# Patient Record
Sex: MALE | Race: BLACK OR AFRICAN AMERICAN | NOT HISPANIC OR LATINO | URBAN - METROPOLITAN AREA
[De-identification: names, ages, dates, MRNs, and addresses within clinical notes are randomized per-mention and may not be internally consistent; named-entity substitution may affect disease eponyms.]

---

## 2020-09-02 ENCOUNTER — INPATIENT (INPATIENT)
Facility: HOSPITAL | Age: 75
LOS: 2 days | Discharge: HOME | End: 2020-09-05
Attending: INTERNAL MEDICINE | Admitting: INTERNAL MEDICINE
Payer: MEDICARE

## 2020-09-02 VITALS
DIASTOLIC BLOOD PRESSURE: 109 MMHG | HEIGHT: 76 IN | SYSTOLIC BLOOD PRESSURE: 151 MMHG | OXYGEN SATURATION: 96 % | TEMPERATURE: 98 F | RESPIRATION RATE: 18 BRPM | HEART RATE: 115 BPM | WEIGHT: 134.92 LBS

## 2020-09-02 DIAGNOSIS — R33.8 OTHER RETENTION OF URINE: ICD-10-CM

## 2020-09-02 DIAGNOSIS — R62.7 ADULT FAILURE TO THRIVE: ICD-10-CM

## 2020-09-02 DIAGNOSIS — N13.30 UNSPECIFIED HYDRONEPHROSIS: ICD-10-CM

## 2020-09-02 DIAGNOSIS — E53.8 DEFICIENCY OF OTHER SPECIFIED B GROUP VITAMINS: ICD-10-CM

## 2020-09-02 DIAGNOSIS — N18.9 CHRONIC KIDNEY DISEASE, UNSPECIFIED: ICD-10-CM

## 2020-09-02 DIAGNOSIS — N17.9 ACUTE KIDNEY FAILURE, UNSPECIFIED: ICD-10-CM

## 2020-09-02 DIAGNOSIS — E43 UNSPECIFIED SEVERE PROTEIN-CALORIE MALNUTRITION: ICD-10-CM

## 2020-09-02 DIAGNOSIS — E87.6 HYPOKALEMIA: ICD-10-CM

## 2020-09-02 DIAGNOSIS — D17.39 BENIGN LIPOMATOUS NEOPLASM OF SKIN AND SUBCUTANEOUS TISSUE OF OTHER SITES: ICD-10-CM

## 2020-09-02 DIAGNOSIS — E83.39 OTHER DISORDERS OF PHOSPHORUS METABOLISM: ICD-10-CM

## 2020-09-02 DIAGNOSIS — N40.1 BENIGN PROSTATIC HYPERPLASIA WITH LOWER URINARY TRACT SYMPTOMS: ICD-10-CM

## 2020-09-02 DIAGNOSIS — E87.0 HYPEROSMOLALITY AND HYPERNATREMIA: ICD-10-CM

## 2020-09-02 LAB
ALBUMIN SERPL ELPH-MCNC: 3.3 G/DL — SIGNIFICANT CHANGE UP (ref 3.3–5)
ALP SERPL-CCNC: 68 U/L — SIGNIFICANT CHANGE UP (ref 40–120)
ALT FLD-CCNC: 13 U/L — SIGNIFICANT CHANGE UP (ref 12–78)
ANION GAP SERPL CALC-SCNC: 24 MMOL/L — HIGH (ref 5–17)
APPEARANCE UR: CLEAR — SIGNIFICANT CHANGE UP
APTT BLD: 33.7 SEC — SIGNIFICANT CHANGE UP (ref 27.5–35.5)
AST SERPL-CCNC: 14 U/L — LOW (ref 15–37)
BACTERIA # UR AUTO: ABNORMAL
BASE EXCESS BLDA CALC-SCNC: -9.6 MMOL/L — LOW (ref -2–2)
BASOPHILS # BLD AUTO: 0.02 K/UL — SIGNIFICANT CHANGE UP (ref 0–0.2)
BASOPHILS NFR BLD AUTO: 0.2 % — SIGNIFICANT CHANGE UP (ref 0–2)
BILIRUB SERPL-MCNC: 0.5 MG/DL — SIGNIFICANT CHANGE UP (ref 0.2–1.2)
BILIRUB UR-MCNC: NEGATIVE — SIGNIFICANT CHANGE UP
BLOOD GAS COMMENTS: SIGNIFICANT CHANGE UP
BLOOD GAS COMMENTS: SIGNIFICANT CHANGE UP
BLOOD GAS SOURCE: SIGNIFICANT CHANGE UP
BUN SERPL-MCNC: 162 MG/DL — HIGH (ref 7–23)
CALCIUM SERPL-MCNC: 9.8 MG/DL — SIGNIFICANT CHANGE UP (ref 8.5–10.1)
CHLORIDE SERPL-SCNC: 104 MMOL/L — SIGNIFICANT CHANGE UP (ref 96–108)
CK MB CFR SERPL CALC: 2.1 NG/ML — SIGNIFICANT CHANGE UP (ref 0.5–3.6)
CO2 SERPL-SCNC: 16 MMOL/L — LOW (ref 22–31)
COLOR SPEC: YELLOW — SIGNIFICANT CHANGE UP
COMMENT - URINE: SIGNIFICANT CHANGE UP
CREAT SERPL-MCNC: 23.2 MG/DL — HIGH (ref 0.5–1.3)
DIFF PNL FLD: ABNORMAL
EOSINOPHIL # BLD AUTO: 0.15 K/UL — SIGNIFICANT CHANGE UP (ref 0–0.5)
EOSINOPHIL NFR BLD AUTO: 1.9 % — SIGNIFICANT CHANGE UP (ref 0–6)
GLUCOSE SERPL-MCNC: 113 MG/DL — HIGH (ref 70–99)
GLUCOSE UR QL: NEGATIVE MG/DL — SIGNIFICANT CHANGE UP
HCO3 BLDA-SCNC: 14 MMOL/L — LOW (ref 21–29)
HCT VFR BLD CALC: 38.5 % — LOW (ref 39–50)
HGB BLD-MCNC: 13.1 G/DL — SIGNIFICANT CHANGE UP (ref 13–17)
HOROWITZ INDEX BLDA+IHG-RTO: 0.21 — SIGNIFICANT CHANGE UP
IMM GRANULOCYTES NFR BLD AUTO: 0.5 % — SIGNIFICANT CHANGE UP (ref 0–1.5)
INR BLD: 1.43 RATIO — HIGH (ref 0.88–1.16)
KETONES UR-MCNC: ABNORMAL
LACTATE SERPL-SCNC: 1 MMOL/L — SIGNIFICANT CHANGE UP (ref 0.7–2)
LEUKOCYTE ESTERASE UR-ACNC: ABNORMAL
LIDOCAIN IGE QN: 459 U/L — HIGH (ref 73–393)
LYMPHOCYTES # BLD AUTO: 0.71 K/UL — LOW (ref 1–3.3)
LYMPHOCYTES # BLD AUTO: 8.8 % — LOW (ref 13–44)
MAGNESIUM SERPL-MCNC: 2.5 MG/DL — SIGNIFICANT CHANGE UP (ref 1.6–2.6)
MCHC RBC-ENTMCNC: 29.3 PG — SIGNIFICANT CHANGE UP (ref 27–34)
MCHC RBC-ENTMCNC: 34 GM/DL — SIGNIFICANT CHANGE UP (ref 32–36)
MCV RBC AUTO: 86.1 FL — SIGNIFICANT CHANGE UP (ref 80–100)
MONOCYTES # BLD AUTO: 0.9 K/UL — SIGNIFICANT CHANGE UP (ref 0–0.9)
MONOCYTES NFR BLD AUTO: 11.2 % — SIGNIFICANT CHANGE UP (ref 2–14)
NEUTROPHILS # BLD AUTO: 6.23 K/UL — SIGNIFICANT CHANGE UP (ref 1.8–7.4)
NEUTROPHILS NFR BLD AUTO: 77.4 % — HIGH (ref 43–77)
NITRITE UR-MCNC: NEGATIVE — SIGNIFICANT CHANGE UP
NRBC # BLD: 0 /100 WBCS — SIGNIFICANT CHANGE UP (ref 0–0)
NT-PROBNP SERPL-SCNC: 550 PG/ML — HIGH (ref 0–450)
PCO2 BLDA: 25 MMHG — LOW (ref 32–46)
PH BLD: 7.37 — SIGNIFICANT CHANGE UP (ref 7.35–7.45)
PH UR: 5 — SIGNIFICANT CHANGE UP (ref 5–8)
PLATELET # BLD AUTO: 183 K/UL — SIGNIFICANT CHANGE UP (ref 150–400)
PO2 BLDA: 49 MMHG — CRITICAL LOW (ref 74–108)
POTASSIUM SERPL-MCNC: 4.2 MMOL/L — SIGNIFICANT CHANGE UP (ref 3.5–5.3)
POTASSIUM SERPL-SCNC: 4.2 MMOL/L — SIGNIFICANT CHANGE UP (ref 3.5–5.3)
PROT SERPL-MCNC: 8.1 GM/DL — SIGNIFICANT CHANGE UP (ref 6–8.3)
PROT UR-MCNC: 15 MG/DL
PROTHROM AB SERPL-ACNC: 16.3 SEC — HIGH (ref 10.6–13.6)
RBC # BLD: 4.47 M/UL — SIGNIFICANT CHANGE UP (ref 4.2–5.8)
RBC # FLD: 13.2 % — SIGNIFICANT CHANGE UP (ref 10.3–14.5)
RBC CASTS # UR COMP ASSIST: SIGNIFICANT CHANGE UP /HPF (ref 0–4)
SAO2 % BLDA: 80 % — LOW (ref 92–96)
SARS-COV-2 RNA SPEC QL NAA+PROBE: SIGNIFICANT CHANGE UP
SODIUM SERPL-SCNC: 144 MMOL/L — SIGNIFICANT CHANGE UP (ref 135–145)
SP GR SPEC: 1.01 — SIGNIFICANT CHANGE UP (ref 1.01–1.02)
TROPONIN I SERPL-MCNC: <.015 NG/ML — SIGNIFICANT CHANGE UP (ref 0.01–0.04)
TSH SERPL-MCNC: 1.15 UU/ML — SIGNIFICANT CHANGE UP (ref 0.36–3.74)
UROBILINOGEN FLD QL: NEGATIVE MG/DL — SIGNIFICANT CHANGE UP
WBC # BLD: 8.05 K/UL — SIGNIFICANT CHANGE UP (ref 3.8–10.5)
WBC # FLD AUTO: 8.05 K/UL — SIGNIFICANT CHANGE UP (ref 3.8–10.5)
WBC UR QL: ABNORMAL

## 2020-09-02 PROCEDURE — 99223 1ST HOSP IP/OBS HIGH 75: CPT | Mod: AI

## 2020-09-02 PROCEDURE — 74176 CT ABD & PELVIS W/O CONTRAST: CPT | Mod: 26

## 2020-09-02 PROCEDURE — 71250 CT THORAX DX C-: CPT | Mod: 26

## 2020-09-02 PROCEDURE — 93010 ELECTROCARDIOGRAM REPORT: CPT

## 2020-09-02 PROCEDURE — 99291 CRITICAL CARE FIRST HOUR: CPT | Mod: CS

## 2020-09-02 PROCEDURE — 70450 CT HEAD/BRAIN W/O DYE: CPT | Mod: 26

## 2020-09-02 RX ORDER — AMLODIPINE BESYLATE 2.5 MG/1
10 TABLET ORAL ONCE
Refills: 0 | Status: COMPLETED | OUTPATIENT
Start: 2020-09-02 | End: 2020-09-02

## 2020-09-02 RX ORDER — HYDRALAZINE HCL 50 MG
10 TABLET ORAL ONCE
Refills: 0 | Status: COMPLETED | OUTPATIENT
Start: 2020-09-02 | End: 2020-09-02

## 2020-09-02 RX ORDER — SODIUM CHLORIDE 9 MG/ML
1000 INJECTION, SOLUTION INTRAVENOUS ONCE
Refills: 0 | Status: COMPLETED | OUTPATIENT
Start: 2020-09-02 | End: 2020-09-02

## 2020-09-02 RX ORDER — INFLUENZA VIRUS VACCINE 15; 15; 15; 15 UG/.5ML; UG/.5ML; UG/.5ML; UG/.5ML
0.5 SUSPENSION INTRAMUSCULAR ONCE
Refills: 0 | Status: DISCONTINUED | OUTPATIENT
Start: 2020-09-02 | End: 2020-09-05

## 2020-09-02 RX ADMIN — SODIUM CHLORIDE 1000 MILLILITER(S): 9 INJECTION, SOLUTION INTRAVENOUS at 07:42

## 2020-09-02 RX ADMIN — AMLODIPINE BESYLATE 10 MILLIGRAM(S): 2.5 TABLET ORAL at 08:32

## 2020-09-02 RX ADMIN — Medication 10 MILLIGRAM(S): at 08:31

## 2020-09-02 RX ADMIN — SODIUM CHLORIDE 1000 MILLILITER(S): 9 INJECTION, SOLUTION INTRAVENOUS at 08:32

## 2020-09-02 NOTE — ED PROVIDER NOTE - CLINICAL SUMMARY MEDICAL DECISION MAKING FREE TEXT BOX
failure to thrive. likely constipated. likely lipoma of chest. admit for placement  I read ekg as sinus tach rate 112, pacs, lad, no st elevation or depression, no t inversions, qtc 469. failure to thrive. likely constipated. likely lipoma of chest. admit for placement  I read ekg as sinus tach rate 112, pacs, lad, no st elevation or depression, no t inversions, qtc 469.  kidneys have failed. pt has anion gap acidosis that is likely uremic acidosis vs starvation ketosis. case kristy moody.

## 2020-09-02 NOTE — ED ADULT TRIAGE NOTE - CHIEF COMPLAINT QUOTE
Pt BIBA for constipation for 2 days. However, Pt has been noncompliant with meds for months, developed a lump on the left side of his chest a few months ago and a distended abd today. Decreased PO intake. Pt appears in unkempt condition. Normally A&Ox4 , today he is A&Ox2. Pt lives by himself.

## 2020-09-02 NOTE — ED PROVIDER NOTE - SECONDARY DIAGNOSIS.
Acute renal failure, unspecified acute renal failure type Lipoma of torso Other constipation Metabolic acidosis

## 2020-09-02 NOTE — H&P ADULT - ASSESSMENT
75 years old male with acute kidney injury and after toure placed 1700  liter of urine came out - patient used to be on meds  like warfarin but none sice january     IMPROVE VTE Individual Risk Assessment          RISK                                                          Points  [  ] Previous VTE                                                3  [  ] Thrombophilia                                             2  [  ] Lower limb paralysis                                   2        (unable to hold up >15 seconds)    [  ] Current Cancer                                             2         (within 6 months)  [  ] Immobilization > 24 hrs                              1  [  ] ICU/CCU stay > 24 hours                             1  [  ] Age > 60                                                         1    IMPROVE VTE Score: 2

## 2020-09-02 NOTE — H&P ADULT - NSHPPHYSICALEXAM_GEN_ALL_CORE
ICU Vital Signs Last 24 Hrs  T(C): 36.8 (02 Sep 2020 10:23), Max: 36.8 (02 Sep 2020 07:32)  T(F): 98.3 (02 Sep 2020 10:23), Max: 98.3 (02 Sep 2020 07:32)  HR: 116 (02 Sep 2020 10:23) (105 - 116)  BP: 140/90 (02 Sep 2020 10:23) (140/90 - 152/112)  BP(mean): --  ABP: --  ABP(mean): --  RR: 18 (02 Sep 2020 10:23) (17 - 18)  SpO2: 100% (02 Sep 2020 10:23) (96% - 100%)  GENERAL: NAD well-developed  HEAD:  Atraumatic, Normocephalic  EYES: EOMI, PERRLA, conjunctiva and sclera clear  ENMT: No tonsillar erythema, exudates, or enlargement; Moist mucous membranes, Good dentition, No lesions  NECK: Supple, No JVD, Normal thyroid  NERVOUS SYSTEM:  Alert & Oriented X3, Good concentration; Motor Strength 5/5 B/L upper and lower extremities; DTRs 2+ intact and symmetric  CHEST/LUNG: Clear to percussion bilaterally; No rales, rhonchi, wheezing, or rubs  HEART: Regular rate and rhythm; No murmurs, rubs, or gallops  ABDOMEN: Soft, Nontender, Nondistended; Bowel sounds present  EXTREMITIES:  2+ Peripheral Pulses, No clubbing, cyanosis, or edema  LYMPH: No lymphadenopathy   SKIN: No rashes or lesions ICU Vital Signs Last 24 Hrs  T(C): 36.8 (02 Sep 2020 10:23), Max: 36.8 (02 Sep 2020 07:32)  T(F): 98.3 (02 Sep 2020 10:23), Max: 98.3 (02 Sep 2020 07:32)  HR: 116 (02 Sep 2020 10:23) (105 - 116)  BP: 140/90 (02 Sep 2020 10:23) (140/90 - 152/112)  BP(mean): --  ABP: --  ABP(mean): --  RR: 18 (02 Sep 2020 10:23) (17 - 18)  SpO2: 100% (02 Sep 2020 10:23) (96% - 100%)  GENERAL: NAD well-developed  HEAD:  Atraumatic, Normocephalic  EYES: EOMI, PERRLA, conjunctiva and sclera clear  ENMT: No tonsillar erythema, exudates, or enlargement; Moist mucous membranes, Good dentition, No lesions  NECK: Supple, No JVD, Normal thyroid  NERVOUS SYSTEM:  Alert & Oriented X2, Good concentration; Motor Strength 5/5 B/L upper and lower extremities; DTRs 2+ intact and symmetric  CHEST/LUNG: Clear to percussion bilaterally; No rales, rhonchi, wheezing, or rubs  HEART: Regular rate and rhythm; No murmurs, rubs, or gallops  ABDOMEN: Soft, Nontender, Nondistended; Bowel sounds present  EXTREMITIES:  2+ Peripheral Pulses, No clubbing, cyanosis, or edema  LYMPH: No lymphadenopathy   SKIN: No rashes or lesions

## 2020-09-02 NOTE — CONSULT NOTE ADULT - SUBJECTIVE AND OBJECTIVE BOX
Patient chart reviewed, full consult to follow.   JOAQUIN post renal azotemia.  Patient alert x1; no tremors;   Grossly enlarged bladder;  Will trend Cr post toure, hold hemodialytic intervention  Discussed with daughter at bedside.    Thank you for the courtesy of this consultation. [facility] NEPHROLOGY [facility], [facility]  NEPHROLOGY AND HYPERTENSION  [address]  [address]  [place] [address]  [phone]    MD FRANCISCO KING MD ANDREY GONCHARUK, MD MADHU KORRAPATI, MD YELENA ROSENBERG, MD BINNY KOSHY, MD CHRISTOPHER CAPUTO, MD BRITT NORRIS MD      Information from chart:  "Patient is a 75y old  Male who presents with a chief complaint of failure to thrive (02 Sep 2020 10:15)    HPI:  75m unknown med hx pw decreased responsiveness and ability to care for self. per daughter at bedside, she went to see him and he was lying with his abd markedly distended, unable to have a bm. also left chest lipoma bigger than normal. pt complains of pain "everywhere" but doesn't elaborate (02 Sep 2020 10:15)   "    Daughter at bedside; increasing abd distention for 1 week       PAST MEDICAL & SURGICAL HISTORY:  No pertinent past medical history    FAMILY HISTORY:    Allergies    No Known Allergies    Intolerances      Home Medications:    MEDICATIONS  (STANDING):    MEDICATIONS  (PRN):      Patient alert x1 conversive  Vital Signs Last 24 Hrs  T(C): 36.9 (02 Sep 2020 12:06), Max: 36.9 (02 Sep 2020 12:06)  T(F): 98.4 (02 Sep 2020 12:06), Max: 98.4 (02 Sep 2020 12:06)  HR: 102 (02 Sep 2020 12:06) (102 - 116)  BP: 134/77 (02 Sep 2020 12:06) (134/77 - 152/112)  BP(mean): --  RR: 18 (02 Sep 2020 12:06) (17 - 18)  SpO2: 97% (02 Sep 2020 12:06) (96% - 100%)    Daily Height in cm: 193.04 (02 Sep 2020 06:59)    Daily     20 @ 07:01  -  20 @ 19:40  --------------------------------------------------------  IN: 0 mL / OUT: 6025 mL / NET: -6025 mL      CAPILLARY BLOOD GLUCOSE        PHYSICAL EXAM:      T(C): 36.9 (20 @ 12:06), Max: 36.9 (20 @ 12:06)  HR: 102 (20 @ 12:06) (102 - 116)  BP: 134/77 (20 @ 12:06) (134/77 - 152/112)  RR: 18 (20 @ 12:06) (17 - 18)  SpO2: 97% (20 @ 12:06) (96% - 100%)  Wt(kg): --  Lungs clear  Heart S1S2 NO RUB  Abd soft NT ND  Extremities:   tr edema                  144  |  104  |  162<H>  ----------------------------<  113<H>  4.2   |  16<L>  |  23.20<H>    Ca    9.8      02 Sep 2020 07:38  Mg     2.5         TPro  8.1  /  Alb  3.3  /  TBili  0.5  /  DBili  x   /  AST  14<L>  /  ALT  13  /  AlkPhos  68                            13.1   8.05  )-----------( 183      ( 02 Sep 2020 07:38 )             38.5     Creatinine Trend: 23.20<--  Urinalysis Basic - ( 02 Sep 2020 11:04 )    Color: Yellow / Appearance: Clear / S.015 / pH: x  Gluc: x / Ketone: Trace  / Bili: Negative / Urobili: Negative mg/dL   Blood: x / Protein: 15 mg/dL / Nitrite: Negative   Leuk Esterase: Trace / RBC: TNTC /HPF / WBC 6-10   Sq Epi: x / Non Sq Epi: x / Bacteria: Occasional      ABG - ( 02 Sep 2020 10:33 )  pH, Arterial: x     pH, Blood: 7.37  /  pCO2: 25    /  pO2: 49    / HCO3: 14    / Base Excess: -9.6  /  SaO2: 80            < from: CT Abdomen and Pelvis No Cont (20 @ 09:39) >  IMPRESSION:  1. No lung consolidations.  2. Anterior left upper chest wall mass (12.1 x 5.4 x 13.8 cm) containing fat and soft tissue, suspicious for liposarcoma.  3. Enlarged prostate gland.  4. Distended urinary bladder.  5. Mild bilateral hydroureteronephrosis, possibly secondary to bladder distention.  6. No bowel obstruction                  Assessment  JOAQUIN post renal azotemia, acute on chronic  Degree of CKD unclear  Patient alert x1; no tremors;   Grossly enlarged bladder;    Plan  Will trend Cr post toure, hold hemodialytic intervention  Discussed with daughter at bedside.    JACIEL Kaufman MD

## 2020-09-02 NOTE — H&P ADULT - HISTORY OF PRESENT ILLNESS
75m unknown med hx pw decreased responsiveness and ability to care for self. per daughter at bedside, she went to see him and he was lying with his abd markedly distended, unable to have a bm. also left chest lipoma bigger than normal. pt complains of pain "everywhere" but doesn't elaborate Full range of motion of upper and lower extremities, no joint tenderness/swelling.

## 2020-09-02 NOTE — ED PROVIDER NOTE - OBJECTIVE STATEMENT
75m unknown med hx pw decreased responsiveness and ability to care for self. per daughter at bedside, she went to see him and he was lying with his abd markedly distended, unable to have a bm. also left chest lipoma bigger than normal. pt complains of pain "everywhere" but doesn't elaborate

## 2020-09-02 NOTE — ED PROVIDER NOTE - CARE PLAN
Principal Discharge DX:	Failure to thrive in adult Principal Discharge DX:	Failure to thrive in adult  Secondary Diagnosis:	Acute renal failure, unspecified acute renal failure type  Secondary Diagnosis:	Lipoma of torso  Secondary Diagnosis:	Other constipation  Secondary Diagnosis:	Metabolic acidosis

## 2020-09-02 NOTE — ED ADULT NURSE NOTE - NSIMPLEMENTINTERV_GEN_ALL_ED
Implemented All Fall Risk Interventions:  [place] to call system. Call bell, personal items and telephone within reach. Instruct patient to call for assistance. Room bathroom lighting operational. Non-slip footwear when patient is off stretcher. Physically safe environment: no spills, clutter or unnecessary equipment. Stretcher in lowest position, wheels locked, appropriate side rails in place. Provide visual cue, wrist band, yellow gown, etc. Monitor gait and stability. Monitor for mental status changes and reorient to person, place, and time. Review medications for side effects contributing to fall risk. Reinforce activity limits and safety measures with patient and family.

## 2020-09-02 NOTE — ED ADULT NURSE NOTE - OBJECTIVE STATEMENT
PT BIBEMS for generalized abdominal pain x 3 days.  As per daughter, pt has been increasingly lethargic and a little confused x 2 days.  Pt AxOx2 (oriented to place and self).  Pt states last BM was approx 3-4 days ago.  Denies fever/chills, n/v.  PMH HTN, DM and angina.

## 2020-09-03 DIAGNOSIS — R22.2 LOCALIZED SWELLING, MASS AND LUMP, TRUNK: ICD-10-CM

## 2020-09-03 LAB
ANION GAP SERPL CALC-SCNC: 10 MMOL/L — SIGNIFICANT CHANGE UP (ref 5–17)
BUN SERPL-MCNC: 83 MG/DL — HIGH (ref 7–23)
CALCIUM SERPL-MCNC: 9.6 MG/DL — SIGNIFICANT CHANGE UP (ref 8.5–10.1)
CHLORIDE SERPL-SCNC: 117 MMOL/L — HIGH (ref 96–108)
CO2 SERPL-SCNC: 22 MMOL/L — SIGNIFICANT CHANGE UP (ref 22–31)
CREAT SERPL-MCNC: 4.5 MG/DL — HIGH (ref 0.5–1.3)
CULTURE RESULTS: NO GROWTH — SIGNIFICANT CHANGE UP
GLUCOSE SERPL-MCNC: 104 MG/DL — HIGH (ref 70–99)
HCV AB S/CO SERPL IA: 0.13 S/CO — SIGNIFICANT CHANGE UP (ref 0–0.99)
HCV AB SERPL-IMP: SIGNIFICANT CHANGE UP
POTASSIUM SERPL-MCNC: 3.6 MMOL/L — SIGNIFICANT CHANGE UP (ref 3.5–5.3)
POTASSIUM SERPL-SCNC: 3.6 MMOL/L — SIGNIFICANT CHANGE UP (ref 3.5–5.3)
SARS-COV-2 IGG SERPL QL IA: NEGATIVE — SIGNIFICANT CHANGE UP
SARS-COV-2 IGM SERPL IA-ACNC: <3.8 AU/ML — SIGNIFICANT CHANGE UP
SODIUM SERPL-SCNC: 149 MMOL/L — HIGH (ref 135–145)
SPECIMEN SOURCE: SIGNIFICANT CHANGE UP

## 2020-09-03 PROCEDURE — 99233 SBSQ HOSP IP/OBS HIGH 50: CPT

## 2020-09-03 PROCEDURE — 99232 SBSQ HOSP IP/OBS MODERATE 35: CPT

## 2020-09-03 RX ORDER — TAMSULOSIN HYDROCHLORIDE 0.4 MG/1
0.4 CAPSULE ORAL AT BEDTIME
Refills: 0 | Status: DISCONTINUED | OUTPATIENT
Start: 2020-09-03 | End: 2020-09-05

## 2020-09-03 RX ORDER — SODIUM CHLORIDE 9 MG/ML
1000 INJECTION, SOLUTION INTRAVENOUS
Refills: 0 | Status: DISCONTINUED | OUTPATIENT
Start: 2020-09-03 | End: 2020-09-05

## 2020-09-03 RX ADMIN — TAMSULOSIN HYDROCHLORIDE 0.4 MILLIGRAM(S): 0.4 CAPSULE ORAL at 21:48

## 2020-09-03 RX ADMIN — SODIUM CHLORIDE 60 MILLILITER(S): 9 INJECTION, SOLUTION INTRAVENOUS at 14:40

## 2020-09-03 NOTE — PROGRESS NOTE ADULT - SUBJECTIVE AND OBJECTIVE BOX
Subjective: no complaints. Ample urine in Rodriguez bag      MEDICATIONS  (STANDING):  influenza   Vaccine 0.5 milliLiter(s) IntraMuscular once    MEDICATIONS  (PRN):          T(C): 36.5 (20 @ 05:29), Max: 36.9 (20 @ 12:06)  HR: 105 (20 @ 05:29) (100 - 116)  BP: 131/87 (20 @ 05:29) (128/78 - 140/90)  RR: 18 (20 @ 05:29) (18 - 18)  SpO2: 98% (20 @ 05:29) (96% - 100%)  Wt(kg): --    ABG - ( 02 Sep 2020 10:33 )  pH, Arterial: x     pH, Blood: 7.37  /  pCO2: 25    /  pO2: 49    / HCO3: 14    / Base Excess: -9.6  /  SaO2: 80                  I&O's Detail    02 Sep 2020 07:01  -  03 Sep 2020 07:00  --------------------------------------------------------  IN:  Total IN: 0 mL    OUT:    Indwelling Catheter - Urethral: 6025 mL    Voided: 1700 mL  Total OUT: 7725 mL    Total NET: -7725 mL               PHYSICAL EXAM:    GENERAL: NAD  NECK: Supple, no inc in JVP  CHEST/LUNG: Clear  HEART: S1S2  ABDOMEN: Soft, Nontender, Nondistended; Bowel sounds present  EXTREMITIES:  no edema  NEURO: no asterixis      LABS:  CBC Full  -  ( 02 Sep 2020 07:38 )  WBC Count : 8.05 K/uL  RBC Count : 4.47 M/uL  Hemoglobin : 13.1 g/dL  Hematocrit : 38.5 %  Platelet Count - Automated : 183 K/uL  Mean Cell Volume : 86.1 fl  Mean Cell Hemoglobin : 29.3 pg  Mean Cell Hemoglobin Concentration : 34.0 gm/dL  Auto Neutrophil # : 6.23 K/uL  Auto Lymphocyte # : 0.71 K/uL  Auto Monocyte # : 0.90 K/uL  Auto Eosinophil # : 0.15 K/uL  Auto Basophil # : 0.02 K/uL  Auto Neutrophil % : 77.4 %  Auto Lymphocyte % : 8.8 %  Auto Monocyte % : 11.2 %  Auto Eosinophil % : 1.9 %  Auto Basophil % : 0.2 %        149<H>  |  117<H>  |  83<H>  ----------------------------<  104<H>  3.6   |  22  |  4.50<H>    Ca    9.6      03 Sep 2020 06:38  Mg     2.5         TPro  8.1  /  Alb  3.3  /  TBili  0.5  /  DBili  x   /  AST  14<L>  /  ALT  13  /  AlkPhos  68      PT/INR - ( 02 Sep 2020 08:51 )   PT: 16.3 sec;   INR: 1.43 ratio         PTT - ( 02 Sep 2020 08:51 )  PTT:33.7 sec  Urinalysis Basic - ( 02 Sep 2020 11:04 )    Color: Yellow / Appearance: Clear / S.015 / pH: x  Gluc: x / Ketone: Trace  / Bili: Negative / Urobili: Negative mg/dL   Blood: x / Protein: 15 mg/dL / Nitrite: Negative   Leuk Esterase: Trace / RBC: TNTC /HPF / WBC 6-10   Sq Epi: x / Non Sq Epi: x / Bacteria: Occasional        Impression:  JOAQUIN post-renal azotemia. Rapidly better  Degree of CKD unclear   Grossly enlarged bladder;    Recommendations:   Remains without dialytic indications  Maintain Rodriguez  Cont to follow Cr

## 2020-09-03 NOTE — PROGRESS NOTE ADULT - SUBJECTIVE AND OBJECTIVE BOX
Patient is a 75y old  Male who presents with a chief complaint of failure to thrive (03 Sep 2020 09:24)      OVERNIGHT EVENTS:  none     MEDICATIONS  (STANDING):  influenza   Vaccine 0.5 milliLiter(s) IntraMuscular once    MEDICATIONS  (PRN):    Allergies    No Known Allergies    Intolerances        SUBJECTIVE: in bed in NAD, no acute events overnight     T(F): 98.3 (20 @ 11:57), Max: 98.3 (20 @ 11:57)  HR: 104 (20 @ 11:57) (100 - 105)  BP: 109/64 (20 @ 11:57) (109/64 - 131/87)  RR: 18 (20 @ 11:57) (18 - 18)  SpO2: 97% (20 @ 11:57) (96% - 98%)  Wt(kg): --    PHYSICAL EXAM:  GENERAL: NAD, well-groomed, well-developed  HEAD:  Atraumatic, Normocephalic  EYES: EOMI, PERRLA, conjunctiva and sclera clear  ENMT: No tonsillar erythema, exudates, or enlargement; Moist mucous membranes, Good dentition, No lesions  NECK: Supple, No JVD, Normal thyroid  CHEST/LUNG: Clear to  auscultation bilaterally; No rales, rhonchi, wheezing, or rubs  bilaterally  HEART: Regular rate and rhythm; No murmurs, rubs, or gallops  ABDOMEN: Soft, Nontender, Nondistended; Bowel sounds present  EXTREMITIES:  2+ Peripheral Pulses, No clubbing, cyanosis, or edema BL LE  SKIN: No rashes or lesions  NERVOUS SYSTEM:  Alert & Oriented X3, Good concentration; Motor Strength 5/5 B/L upper and lower extremities;   DTRs 2+ intact and symmetric, sensation intact BL    LABS:                        13.1   8.05  )-----------( 183      ( 02 Sep 2020 07:38 )             38.5     09    149<H>  |  117<H>  |  83<H>  ----------------------------<  104<H>  3.6   |  22  |  4.50<H>    Ca    9.6      03 Sep 2020 06:38  Mg     2.5     09    TPro  8.1  /  Alb  3.3  /  TBili  0.5  /  DBili  x   /  AST  14<L>  /  ALT  13  /  AlkPhos  68      PT/INR - ( 02 Sep 2020 08:51 )   PT: 16.3 sec;   INR: 1.43 ratio         PTT - ( 02 Sep 2020 08:51 )  PTT:33.7 sec  Urinalysis Basic - ( 02 Sep 2020 11:04 )    Color: Yellow / Appearance: Clear / S.015 / pH: x  Gluc: x / Ketone: Trace  / Bili: Negative / Urobili: Negative mg/dL   Blood: x / Protein: 15 mg/dL / Nitrite: Negative   Leuk Esterase: Trace / RBC: TNTC /HPF / WBC 6-10   Sq Epi: x / Non Sq Epi: x / Bacteria: Occasional      Cultures;   CAPILLARY BLOOD GLUCOSE        Lipid panel:     CARDIAC MARKERS ( 02 Sep 2020 07:38 )  <.015 ng/mL / x     / x     / x     / 2.1 ng/mL        RADIOLOGY & ADDITIONAL TESTS:  < from: CT Abdomen and Pelvis No Cont (20 @ 09:39) >    IMPRESSION:  1. No lung consolidations.  2. Anterior left upper chest wall mass (12.1 x 5.4 x 13.8 cm) containing fat and soft tissue, suspicious for liposarcoma.  3. Enlarged prostate gland.  4. Distended urinary bladder.  5. Mild bilateral hydroureteronephrosis, possibly secondary to bladder distention.  6. No bowel obstruction.        Imaging Personally Reviewed:  [ x] YES      Consultant(s) Notes Reviewed:  [x ] YES     Care Discussed with [x ] Consultants [X ] Patient [x ] Family  [x ]    [x ]  Other; RN Patient is a 75y old  Male who presents with a chief complaint of failure to thrive (03 Sep 2020 09:24)      OVERNIGHT EVENTS:  none     MEDICATIONS  (STANDING):  influenza   Vaccine 0.5 milliLiter(s) IntraMuscular once    MEDICATIONS  (PRN):    Allergies    No Known Allergies    Intolerances        SUBJECTIVE: in bed in NAD, no acute events overnight     T(F): 98.3 (20 @ 11:57), Max: 98.3 (20 @ 11:57)  HR: 104 (20 @ 11:57) (100 - 105)  BP: 109/64 (20 @ 11:57) (109/64 - 131/87)  RR: 18 (20 @ 11:57) (18 - 18)  SpO2: 97% (20 @ 11:57) (96% - 98%)  Wt(kg): --    PHYSICAL EXAM:  GENERAL: NAD, well-groomed, well-developed  HEAD:  Atraumatic, Normocephalic  EYES: EOMI, PERRLA, conjunctiva and sclera clear  ENMT: No tonsillar erythema, exudates, or enlargement; Moist mucous membranes, Good dentition, No lesions  NECK: Supple, No JVD, Normal thyroid  CHEST/LUNG: Clear to  auscultation bilaterally; No rales, rhonchi, wheezing, or rubs  bilaterally, chest wall mass   HEART: Regular rate and rhythm; No murmurs, rubs, or gallops  ABDOMEN: Soft, Nontender, Nondistended; Bowel sounds present  EXTREMITIES:  2+ Peripheral Pulses, No clubbing, cyanosis, or edema BL LE  SKIN: No rashes or lesions  NERVOUS SYSTEM:  Alert & Oriented X3, Good concentration; Motor Strength 5/5 B/L upper and lower extremities;   DTRs 2+ intact and symmetric, sensation intact BL    LABS:                        13.1   8.05  )-----------( 183      ( 02 Sep 2020 07:38 )             38.5         149<H>  |  117<H>  |  83<H>  ----------------------------<  104<H>  3.6   |  22  |  4.50<H>    Ca    9.6      03 Sep 2020 06:38  Mg     2.5     09    TPro  8.1  /  Alb  3.3  /  TBili  0.5  /  DBili  x   /  AST  14<L>  /  ALT  13  /  AlkPhos  68      PT/INR - ( 02 Sep 2020 08:51 )   PT: 16.3 sec;   INR: 1.43 ratio         PTT - ( 02 Sep 2020 08:51 )  PTT:33.7 sec  Urinalysis Basic - ( 02 Sep 2020 11:04 )    Color: Yellow / Appearance: Clear / S.015 / pH: x  Gluc: x / Ketone: Trace  / Bili: Negative / Urobili: Negative mg/dL   Blood: x / Protein: 15 mg/dL / Nitrite: Negative   Leuk Esterase: Trace / RBC: TNTC /HPF / WBC 6-10   Sq Epi: x / Non Sq Epi: x / Bacteria: Occasional      Cultures;   CAPILLARY BLOOD GLUCOSE        Lipid panel:     CARDIAC MARKERS ( 02 Sep 2020 07:38 )  <.015 ng/mL / x     / x     / x     / 2.1 ng/mL        RADIOLOGY & ADDITIONAL TESTS:  < from: CT Abdomen and Pelvis No Cont (20 @ 09:39) >    IMPRESSION:  1. No lung consolidations.  2. Anterior left upper chest wall mass (12.1 x 5.4 x 13.8 cm) containing fat and soft tissue, suspicious for liposarcoma.  3. Enlarged prostate gland.  4. Distended urinary bladder.  5. Mild bilateral hydroureteronephrosis, possibly secondary to bladder distention.  6. No bowel obstruction.        Imaging Personally Reviewed:  [ x] YES      Consultant(s) Notes Reviewed:  [x ] YES     Care Discussed with [x ] Consultants [X ] Patient [x ] Family  [x ]    [x ]  Other; RN Patient is a 75y old  Male who presents with a chief complaint of failure to thrive (03 Sep 2020 09:24)      OVERNIGHT EVENTS:  none     MEDICATIONS  (STANDING):  influenza   Vaccine 0.5 milliLiter(s) IntraMuscular once    MEDICATIONS  (PRN):    Allergies    No Known Allergies    Intolerances        SUBJECTIVE: in bed in NAD, no acute events overnight     T(F): 98.3 (20 @ 11:57), Max: 98.3 (20 @ 11:57)  HR: 104 (20 @ 11:57) (100 - 105)  BP: 109/64 (20 @ 11:57) (109/64 - 131/87)  RR: 18 (20 @ 11:57) (18 - 18)  SpO2: 97% (20 @ 11:57) (96% - 98%)  Wt(kg): --    PHYSICAL EXAM:  GENERAL: NAD, well-groomed, well-developed  HEAD:  Atraumatic, Normocephalic  EYES: EOMI, PERRLA, conjunctiva and sclera clear  ENMT: No tonsillar erythema, exudates, or enlargement; Moist mucous membranes, Good dentition, No lesions  NECK: Supple, No JVD, Normal thyroid  CHEST/LUNG: Clear to  auscultation bilaterally; No rales, rhonchi, wheezing, or rubs  bilaterally, chest wall mass   HEART: Regular rate and rhythm; No murmurs, rubs, or gallops  ABDOMEN: Soft, Nontender, Nondistended; Bowel sounds present  EXTREMITIES:  2+ Peripheral Pulses, No clubbing, cyanosis, or edema BL LE  SKIN: No rashes or lesions  NERVOUS SYSTEM:  Alert & Oriented X1, Good concentration; Motor Strength 4-/5 B/L upper and lower extremities;   DTRs 2+ intact and symmetric, sensation intact BL    LABS:                        13.1   8.05  )-----------( 183      ( 02 Sep 2020 07:38 )             38.5     09    149<H>  |  117<H>  |  83<H>  ----------------------------<  104<H>  3.6   |  22  |  4.50<H>    Ca    9.6      03 Sep 2020 06:38  Mg     2.5     09    TPro  8.1  /  Alb  3.3  /  TBili  0.5  /  DBili  x   /  AST  14<L>  /  ALT  13  /  AlkPhos  68      PT/INR - ( 02 Sep 2020 08:51 )   PT: 16.3 sec;   INR: 1.43 ratio         PTT - ( 02 Sep 2020 08:51 )  PTT:33.7 sec  Urinalysis Basic - ( 02 Sep 2020 11:04 )    Color: Yellow / Appearance: Clear / S.015 / pH: x  Gluc: x / Ketone: Trace  / Bili: Negative / Urobili: Negative mg/dL   Blood: x / Protein: 15 mg/dL / Nitrite: Negative   Leuk Esterase: Trace / RBC: TNTC /HPF / WBC 6-10   Sq Epi: x / Non Sq Epi: x / Bacteria: Occasional      Cultures;   CAPILLARY BLOOD GLUCOSE        Lipid panel:     CARDIAC MARKERS ( 02 Sep 2020 07:38 )  <.015 ng/mL / x     / x     / x     / 2.1 ng/mL        RADIOLOGY & ADDITIONAL TESTS:  < from: CT Abdomen and Pelvis No Cont (20 @ 09:39) >    IMPRESSION:  1. No lung consolidations.  2. Anterior left upper chest wall mass (12.1 x 5.4 x 13.8 cm) containing fat and soft tissue, suspicious for liposarcoma.  3. Enlarged prostate gland.  4. Distended urinary bladder.  5. Mild bilateral hydroureteronephrosis, possibly secondary to bladder distention.  6. No bowel obstruction.        Imaging Personally Reviewed:  [ x] YES      Consultant(s) Notes Reviewed:  [x ] YES     Care Discussed with [x ] Consultants [X ] Patient [x ] Family  [x ]    [x ]  Other; RN

## 2020-09-03 NOTE — CONSULT NOTE ADULT - ATTENDING COMMENTS
Mr. Landin was examined. Daughter bedside. Cross sectional imaging reviewed. On exam there was obvious chest wall asymmetry with a mass on the left chest measuring approximately 12 x 13 cm well circumscribed, mobile, nontender, no erythema, adherent to deeper structures. Overlying skin is normal. 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 MRI pending. Wide local surgical resection with wide margin is a curative option.

## 2020-09-03 NOTE — CONSULT NOTE ADULT - ASSESSMENT
A/P: 75M with enlarging anterior left upper chest wall mass, r/o liposarcoma  - recommend MRI of chest wall  - pending findings, possible biopsy vs OR excision of mass  - pt/family aware and agreeable  - discussed with Dr. Spencer

## 2020-09-03 NOTE — CONSULT NOTE ADULT - SUBJECTIVE AND OBJECTIVE BOX
HPI:  75m unknown med hx pw decreased responsiveness and ability to care for self. per daughter at bedside, she went to see him and he was lying with his abd markedly distended, unable to have a bm. also left chest lipoma bigger than normal. pt complains of pain "everywhere" but doesn't elaborate (02 Sep 2020 10:15)    Patient seen and examined at bedside with Dr. Spencer. Daughter present.  Patient reports "lump" on his left upper chest for years, but notes recent significant enlargement of mass.   Denies any complaints associated with lump; denies tenderness, neck pain, LUE pain/tingling/numbness.      PAST MEDICAL & SURGICAL HISTORY:  No pertinent past medical history      Review of Systems:  Contained within HPI    MEDICATIONS  (STANDING):  dextrose 5% + sodium chloride 0.45%. 1000 milliLiter(s) (60 mL/Hr) IV Continuous <Continuous>  influenza   Vaccine 0.5 milliLiter(s) IntraMuscular once  tamsulosin 0.4 milliGRAM(s) Oral at bedtime    Allergies  No Known Allergies      Vital Signs Last 24 Hrs  T(C): 36.8 (03 Sep 2020 11:57), Max: 36.8 (03 Sep 2020 11:57)  T(F): 98.3 (03 Sep 2020 11:57), Max: 98.3 (03 Sep 2020 11:57)  HR: 104 (03 Sep 2020 11:57) (100 - 105)  BP: 109/64 (03 Sep 2020 11:57) (109/64 - 131/87)  RR: 18 (03 Sep 2020 11:57) (18 - 18)  SpO2: 97% (03 Sep 2020 11:57) (96% - 98%)    Physical Exam:  General: Appears stated age, well-groomed, well-nourished, no distress  Chest: Large, soft, anterior left upper chest wall mass. Nontender. No skin changes. No warmth/edema/erythema  Cardiovascular: S1S2 tachycardic  Abdomen: Soft   Neuro/Psych: Alert, oriented      LABS:                        13.1   8.05  )-----------( 183      ( 02 Sep 2020 07:38 )             38.5     09-03    149<H>  |  117<H>  |  83<H>  ----------------------------<  104<H>  3.6   |  22  |  4.50<H>    Ca    9.6      03 Sep 2020 06:38  Mg     2.5         TPro  8.1  /  Alb  3.3  /  TBili  0.5  /  DBili  x   /  AST  14<L>  /  ALT  13  /  AlkPhos  68      PT/INR - ( 02 Sep 2020 08:51 )   PT: 16.3 sec;   INR: 1.43 ratio         PTT - ( 02 Sep 2020 08:51 )  PTT:33.7 sec  Urinalysis Basic - ( 02 Sep 2020 11:04 )    Color: Yellow / Appearance: Clear / S.015 / pH: x  Gluc: x / Ketone: Trace  / Bili: Negative / Urobili: Negative mg/dL   Blood: x / Protein: 15 mg/dL / Nitrite: Negative   Leuk Esterase: Trace / RBC: TNTC /HPF / WBC 6-10   Sq Epi: x / Non Sq Epi: x / Bacteria: Occasional      RADIOLOGY & ADDITIONAL STUDIES:  < from: CT Abdomen and Pelvis No Cont (20 @ 09:39) >  FINDINGS:    Please note that evaluation of the chest/abdominal organs and vascular structures is limited by lack of intravenous contrast.    CHEST:  LUNGS AND LARGE AIRWAYS: Patent central airways. Clear lungs.  PLEURA: No pleural effusion. Calcified pleural plaques.  VESSELS: Atherosclerotic calcifications.  HEART: Heart size is normal. No pericardial effusion.  MEDIASTINUM AND DESI: No lymphadenopathy.  CHEST WALL AND LOWER NECK: Anterior left upper chest wall mass measuring 12.1 x 5.4 x 13.8 cm and containing mostly fat with soft tissue components.    ABDOMEN AND PELVIS:  LIVER: Within normal limits.  BILE DUCTS: Normal caliber.  GALLBLADDER: Within normal limits.  SPLEEN: Within normal limits.  PANCREAS: Within normal limits.  ADRENALS: Indeterminate 1.3 cm left adrenal nodule (12 Hounsfield units).  KIDNEYS/URETERS: Renal hypodensities, possibly cysts. Nonspecific perinephric stranding and fluid. Mild bilateral hydroureteronephrosis.    BLADDER: Distended.  REPRODUCTIVE ORGANS: Prostate is enlarged (5.9 x 5.8 x 7.2 cm).    BOWEL: No bowel obstruction. Appendix is not visualized. Colonic diverticulosis.  PERITONEUM: No ascites.  VESSELS: Atherosclerotic calcifications.  RETROPERITONEUM/LYMPH NODES: No lymphadenopathy.  ABDOMINAL WALL: Tiny fat-containing umbilical hernia. Mild fat-containing left inguinal hernia.  BONES: Degenerative changes. Chronic lower right rib fractures.    IMPRESSION:  1. No lung consolidations.  2. Anterior left upper chest wall mass (12.1 x 5.4 x 13.8 cm) containing fat and soft tissue, suspicious for liposarcoma.  3. Enlarged prostate gland.  4. Distended urinary bladder.  5. Mild bilateral hydroureteronephrosis, possibly secondary to bladder distention.  6. No bowel obstruction.      CHARMAINE MELENDREZ M.D., ATTENDING RADIOLOGIST  This document has been electronically signed. Sep  2 2020  9:56AM

## 2020-09-04 DIAGNOSIS — E87.6 HYPOKALEMIA: ICD-10-CM

## 2020-09-04 DIAGNOSIS — E87.0 HYPEROSMOLALITY AND HYPERNATREMIA: ICD-10-CM

## 2020-09-04 DIAGNOSIS — E83.39 OTHER DISORDERS OF PHOSPHORUS METABOLISM: ICD-10-CM

## 2020-09-04 LAB
A1C WITH ESTIMATED AVERAGE GLUCOSE RESULT: 5.9 % — HIGH (ref 4–5.6)
AMMONIA BLD-MCNC: 29 UMOL/L — SIGNIFICANT CHANGE UP (ref 11–32)
ANION GAP SERPL CALC-SCNC: 6 MMOL/L — SIGNIFICANT CHANGE UP (ref 5–17)
BUN SERPL-MCNC: 41 MG/DL — HIGH (ref 7–23)
CALCIUM SERPL-MCNC: 9 MG/DL — SIGNIFICANT CHANGE UP (ref 8.5–10.1)
CHLORIDE SERPL-SCNC: 116 MMOL/L — HIGH (ref 96–108)
CHOLEST SERPL-MCNC: 119 MG/DL — SIGNIFICANT CHANGE UP (ref 10–199)
CO2 SERPL-SCNC: 25 MMOL/L — SIGNIFICANT CHANGE UP (ref 22–31)
CREAT SERPL-MCNC: 1.28 MG/DL — SIGNIFICANT CHANGE UP (ref 0.5–1.3)
ESTIMATED AVERAGE GLUCOSE: 123 MG/DL — HIGH (ref 68–114)
FOLATE SERPL-MCNC: 4.9 NG/ML — SIGNIFICANT CHANGE UP
GLUCOSE SERPL-MCNC: 124 MG/DL — HIGH (ref 70–99)
HCT VFR BLD CALC: 35.6 % — LOW (ref 39–50)
HDLC SERPL-MCNC: 38 MG/DL — LOW
HGB BLD-MCNC: 11.7 G/DL — LOW (ref 13–17)
LIPID PNL WITH DIRECT LDL SERPL: 63 MG/DL — SIGNIFICANT CHANGE UP
MAGNESIUM SERPL-MCNC: 2 MG/DL — SIGNIFICANT CHANGE UP (ref 1.6–2.6)
MCHC RBC-ENTMCNC: 29.4 PG — SIGNIFICANT CHANGE UP (ref 27–34)
MCHC RBC-ENTMCNC: 32.9 GM/DL — SIGNIFICANT CHANGE UP (ref 32–36)
MCV RBC AUTO: 89.4 FL — SIGNIFICANT CHANGE UP (ref 80–100)
NRBC # BLD: 0 /100 WBCS — SIGNIFICANT CHANGE UP (ref 0–0)
PHOSPHATE SERPL-MCNC: 1.5 MG/DL — LOW (ref 2.5–4.5)
PLATELET # BLD AUTO: 126 K/UL — LOW (ref 150–400)
POTASSIUM SERPL-MCNC: 3.3 MMOL/L — LOW (ref 3.5–5.3)
POTASSIUM SERPL-SCNC: 3.3 MMOL/L — LOW (ref 3.5–5.3)
RBC # BLD: 3.98 M/UL — LOW (ref 4.2–5.8)
RBC # FLD: 13.2 % — SIGNIFICANT CHANGE UP (ref 10.3–14.5)
SODIUM SERPL-SCNC: 147 MMOL/L — HIGH (ref 135–145)
T PALLIDUM AB TITR SER: NEGATIVE — SIGNIFICANT CHANGE UP
T4 FREE SERPL-MCNC: 1.1 NG/DL — SIGNIFICANT CHANGE UP (ref 0.9–1.8)
TOTAL CHOLESTEROL/HDL RATIO MEASUREMENT: 3.2 RATIO — LOW (ref 3.4–9.6)
TRIGL SERPL-MCNC: 92 MG/DL — SIGNIFICANT CHANGE UP (ref 10–149)
TSH SERPL-MCNC: 1.68 UIU/ML — SIGNIFICANT CHANGE UP (ref 0.36–3.74)
VIT B12 SERPL-MCNC: 273 PG/ML — SIGNIFICANT CHANGE UP (ref 232–1245)
WBC # BLD: 8.96 K/UL — SIGNIFICANT CHANGE UP (ref 3.8–10.5)
WBC # FLD AUTO: 8.96 K/UL — SIGNIFICANT CHANGE UP (ref 3.8–10.5)

## 2020-09-04 PROCEDURE — 99233 SBSQ HOSP IP/OBS HIGH 50: CPT

## 2020-09-04 PROCEDURE — 71550 MRI CHEST W/O DYE: CPT | Mod: 26

## 2020-09-04 RX ORDER — PREGABALIN 225 MG/1
1 CAPSULE ORAL
Qty: 0 | Refills: 0 | DISCHARGE
Start: 2020-09-04

## 2020-09-04 RX ORDER — FOLIC ACID 0.8 MG
1 TABLET ORAL
Qty: 0 | Refills: 0 | DISCHARGE
Start: 2020-09-04

## 2020-09-04 RX ORDER — PREGABALIN 225 MG/1
1000 CAPSULE ORAL DAILY
Refills: 0 | Status: DISCONTINUED | OUTPATIENT
Start: 2020-09-04 | End: 2020-09-05

## 2020-09-04 RX ORDER — POTASSIUM CHLORIDE 20 MEQ
40 PACKET (EA) ORAL EVERY 4 HOURS
Refills: 0 | Status: COMPLETED | OUTPATIENT
Start: 2020-09-04 | End: 2020-09-04

## 2020-09-04 RX ORDER — HEPARIN SODIUM 5000 [USP'U]/ML
5000 INJECTION INTRAVENOUS; SUBCUTANEOUS EVERY 12 HOURS
Refills: 0 | Status: DISCONTINUED | OUTPATIENT
Start: 2020-09-04 | End: 2020-09-05

## 2020-09-04 RX ORDER — FOLIC ACID 0.8 MG
1 TABLET ORAL DAILY
Refills: 0 | Status: DISCONTINUED | OUTPATIENT
Start: 2020-09-04 | End: 2020-09-05

## 2020-09-04 RX ORDER — POTASSIUM PHOSPHATE, MONOBASIC POTASSIUM PHOSPHATE, DIBASIC 236; 224 MG/ML; MG/ML
15 INJECTION, SOLUTION INTRAVENOUS ONCE
Refills: 0 | Status: COMPLETED | OUTPATIENT
Start: 2020-09-04 | End: 2020-09-04

## 2020-09-04 RX ADMIN — SODIUM CHLORIDE 60 MILLILITER(S): 9 INJECTION, SOLUTION INTRAVENOUS at 05:43

## 2020-09-04 RX ADMIN — POTASSIUM PHOSPHATE, MONOBASIC POTASSIUM PHOSPHATE, DIBASIC 62.5 MILLIMOLE(S): 236; 224 INJECTION, SOLUTION INTRAVENOUS at 11:11

## 2020-09-04 RX ADMIN — TAMSULOSIN HYDROCHLORIDE 0.4 MILLIGRAM(S): 0.4 CAPSULE ORAL at 21:54

## 2020-09-04 RX ADMIN — Medication 40 MILLIEQUIVALENT(S): at 11:11

## 2020-09-04 RX ADMIN — HEPARIN SODIUM 5000 UNIT(S): 5000 INJECTION INTRAVENOUS; SUBCUTANEOUS at 15:42

## 2020-09-04 RX ADMIN — Medication 40 MILLIEQUIVALENT(S): at 15:40

## 2020-09-04 RX ADMIN — PREGABALIN 1000 MICROGRAM(S): 225 CAPSULE ORAL at 15:40

## 2020-09-04 NOTE — CONSULT NOTE ADULT - SUBJECTIVE AND OBJECTIVE BOX
Patient is a 75y old  Male who presents with a chief complaint of failure to thrive (04 Sep 2020 10:18)      HPI:  75m unknown med hx pw decreased responsiveness and ability to care for self. per daughter at bedside, she went to see him and he was lying with his abd markedly distended, unable to have a bm. also left chest lipoma bigger than normal. pt complains of pain "everywhere" but doesn't elaborate (02 Sep 2020 10:15)    Pt seen bedside, only complaint is feeling "tired". No n/v. No f/c. Denies abdominal pain.     PAST MEDICAL & SURGICAL HISTORY:  No pertinent past medical history      Review of Systems:  as above    MEDICATIONS  (STANDING):  cyanocobalamin 1000 MICROGram(s) Oral daily  dextrose 5% + sodium chloride 0.45%. 1000 milliLiter(s) (60 mL/Hr) IV Continuous <Continuous>  folic acid 1 milliGRAM(s) Oral daily  heparin   Injectable 5000 Unit(s) SubCutaneous every 12 hours  influenza   Vaccine 0.5 milliLiter(s) IntraMuscular once  potassium chloride    Tablet ER 40 milliEquivalent(s) Oral every 4 hours  tamsulosin 0.4 milliGRAM(s) Oral at bedtime    Allergies  No Known Allergies           Vital Signs Last 24 Hrs  T(C): 36.8 (04 Sep 2020 12:26), Max: 37.1 (04 Sep 2020 05:32)  T(F): 98.3 (04 Sep 2020 12:26), Max: 98.7 (04 Sep 2020 05:32)  HR: 99 (04 Sep 2020 12:26) (99 - 106)  BP: 120/78 (04 Sep 2020 12:26) (116/78 - 128/80)  BP(mean): --  RR: 18 (04 Sep 2020 12:26) (18 - 20)  SpO2: 99% (04 Sep 2020 12:26) (98% - 99%)    Physical Exam:  General: well-nourished, no distress  Eyes: EOMI  HENT: WNL, no JVD  Chest: clear breath sounds. large left chest soft tissue mass  Cardiovascular: Regular rate & rhythm  Abdomen: soft, nontender  Extremities: no pedal edema or calf tenderness noted  Skin: No rash  : circumcised phallus, toure catheter indwelling draining dark clear urine.     LABS:                        11.7   8.96  )-----------( 126      ( 04 Sep 2020 07:28 )             35.6     09-04    147<H>  |  116<H>  |  41<H>  ----------------------------<  124<H>  3.3<L>   |  25  |  1.28    Ca    9.0      04 Sep 2020 07:28  Phos  1.5     09-04  Mg     2.0     09-04      Culture Results:   No growth (09-02 @ 15:04)  Culture Results:   No growth to date. (09-02 @ 11:37)  Culture Results:   No growth to date. (09-02 @ 11:37)      RADIOLOGY & ADDITIONAL STUDIES:  < from: CT Abdomen and Pelvis No Cont (09.02.20 @ 09:39) >  IMPRESSION:  1. No lung consolidations.  2. Anterior left upper chest wall mass (12.1 x 5.4 x 13.8 cm) containing fat and soft tissue, suspicious for liposarcoma.  3. Enlarged prostate gland.  4. Distended urinary bladder.  5. Mild bilateral hydroureteronephrosis, possibly secondary to bladder distention.  6. No bowel obstruction.    < end of copied text >      Impression/Plan: 76yo male seen with severe JOAQUIN and b/l hydronephrosis d/t obstructive uropathy, resolved s/p bladder decompression  continue toure catheter for urinary retention  may d/c with toure  OP  Follow up for definitive prostate procedure planning  c/w flomax  continue medical management

## 2020-09-04 NOTE — PROGRESS NOTE ADULT - ATTENDING COMMENTS
d/w daughter in detail patient is to to return to [place] on Sunday   to live with daughter will provide daughter with labs and images

## 2020-09-04 NOTE — DISCHARGE NOTE PROVIDER - NSDCCPCAREPLAN_GEN_ALL_CORE_FT
PRINCIPAL DISCHARGE DIAGNOSIS  Diagnosis: Acute renal failure, unspecified acute renal failure type  Assessment and Plan of Treatment: Likely due to enlarged prostate.  Improved after toure placement.      SECONDARY DISCHARGE DIAGNOSES  Diagnosis: Lipoma of torso  Assessment and Plan of Treatment: PRINCIPAL DISCHARGE DIAGNOSIS  Diagnosis: Acute renal failure, unspecified acute renal failure type  Assessment and Plan of Treatment: Likely due to enlarged prostate.  Improved after toure placement.      SECONDARY DISCHARGE DIAGNOSES  Diagnosis: Deficiency of vitamin B12  Assessment and Plan of Treatment:     Diagnosis: Enlarged prostate  Assessment and Plan of Treatment:     Diagnosis: Lipoma of torso  Assessment and Plan of Treatment:

## 2020-09-04 NOTE — DISCHARGE NOTE PROVIDER - NSDCMRMEDTOKEN_GEN_ALL_CORE_FT
cyanocobalamin 1000 mcg oral tablet: 1 tab(s) orally once a day  folic acid 1 mg oral tablet: 1 tab(s) orally once a day cyanocobalamin 1000 mcg oral tablet: 1 tab(s) orally once a day  folic acid 1 mg oral tablet: 1 tab(s) orally once a day  tamsulosin 0.4 mg oral capsule: 1 cap(s) orally once a day (at bedtime) cyanocobalamin 1000 mcg oral tablet: 1 tab(s) orally once a day  folic acid 1 mg oral tablet: 1 tab(s) orally once a day  K-Phos Neutral oral tablet: 1 tab(s) orally 2 times a day   Mag-Ox 400 oral tablet: 1 tab(s) orally 2 times a day   tamsulosin 0.4 mg oral capsule: 1 cap(s) orally once a day (at bedtime)

## 2020-09-04 NOTE — DIETITIAN INITIAL EVALUATION ADULT. - PERTINENT MEDS FT
MEDICATIONS  (STANDING):  dextrose 5% + sodium chloride 0.45%. 1000 milliLiter(s) (60 mL/Hr) IV Continuous <Continuous>  heparin   Injectable 5000 Unit(s) SubCutaneous every 12 hours  influenza   Vaccine 0.5 milliLiter(s) IntraMuscular once  potassium chloride    Tablet ER 40 milliEquivalent(s) Oral every 4 hours  tamsulosin 0.4 milliGRAM(s) Oral at bedtime    MEDICATIONS  (PRN):

## 2020-09-04 NOTE — PROGRESS NOTE ADULT - SUBJECTIVE AND OBJECTIVE BOX
[facility] NEPHROLOGY SERVICES, [facility]  NEPHROLOGY AND HYPERTENSION  [address]  [address]  [place] [address]  [phone]    MD FRANCISCO KING, MD TREVIN PORTILLO, MD YUDY PEREA, MD ERICKSON HENSON, MD HUYEN WONG, MD BRITT NORRIS MD          Patient events noted    MEDICATIONS  (STANDING):  cyanocobalamin 1000 MICROGram(s) Oral daily  dextrose 5% + sodium chloride 0.45%. 1000 milliLiter(s) (60 mL/Hr) IV Continuous <Continuous>  folic acid 1 milliGRAM(s) Oral daily  heparin   Injectable 5000 Unit(s) SubCutaneous every 12 hours  influenza   Vaccine 0.5 milliLiter(s) IntraMuscular once  tamsulosin 0.4 milliGRAM(s) Oral at bedtime    MEDICATIONS  (PRN):      09-03-20 @ 07:01  -  09-04-20 @ 07:00  --------------------------------------------------------  IN: 970 mL / OUT: 2300 mL / NET: -1330 mL    09-04-20 @ 07:01  -  09-04-20 @ 19:10  --------------------------------------------------------  IN: 0 mL / OUT: 700 mL / NET: -700 mL      PHYSICAL EXAM:      T(C): 36.7 (09-04-20 @ 17:15), Max: 37.1 (09-04-20 @ 05:32)  HR: 86 (09-04-20 @ 17:15) (86 - 102)  BP: 148/86 (09-04-20 @ 17:15) (116/78 - 148/86)  RR: 18 (09-04-20 @ 17:15) (18 - 18)  SpO2: 100% (09-04-20 @ 17:15) (97% - 100%)  Wt(kg): --  Lungs clear  Heart S1S2  Abd soft NT ND  Extremities:   tr edema                                    11.7   8.96  )-----------( 126      ( 04 Sep 2020 07:28 )             35.6     09-04    147<H>  |  116<H>  |  41<H>  ----------------------------<  124<H>  3.3<L>   |  25  |  1.28    Ca    9.0      04 Sep 2020 07:28  Phos  1.5     09-04  Mg     2.0     09-04          Creatinine Trend: 1.28<--, 4.50<--, 23.20<--      Assessment   JOAQUIN post renal azotemia;   Improved    Plan:    Continue current rx    Nickolas Kaufman MD

## 2020-09-04 NOTE — PROGRESS NOTE ADULT - PROBLEM SELECTOR PLAN 4
suspicious for liposarcoma will consult general surgery   f/u mri
suspicious for liposarcoma will consult general surgery

## 2020-09-04 NOTE — PHYSICAL THERAPY INITIAL EVALUATION ADULT - ACTIVE RANGE OF MOTION EXAMINATION, REHAB EVAL
bilateral lower extremity Active ROM was WNL (within normal limits)/geetha. upper extremity Active ROM was WNL (within normal limits)

## 2020-09-04 NOTE — PROGRESS NOTE ADULT - SUBJECTIVE AND OBJECTIVE BOX
Patient seen and examined bedside resting comfortably.  No complaints offered.   Admits he feels tired. No new events.    T(F): 98.3 (09-04-20 @ 12:26), Max: 98.7 (09-04-20 @ 05:32)  HR: 97 (09-04-20 @ 15:42) (97 - 106)  BP: 130/87 (09-04-20 @ 15:42) (116/78 - 130/87)  RR: 18 (09-04-20 @ 15:42) (18 - 20)  SpO2: 97% (09-04-20 @ 15:42) (97% - 99%)  Wt(kg): --    PHYSICAL EXAM:  General: NAD, WDWN  HEENT: NCAT, EOMI, conjunctiva clear  Chest: respirations nonlabored, good inspiratory effort. Left chest wall large soft tissue mass, nontender  Abdomen: soft, NT  Extremities: no pedal edema or calf tenderness noted     LABS:                        11.7   8.96  )-----------( 126      ( 04 Sep 2020 07:28 )             35.6     09-04    147<H>  |  116<H>  |  41<H>  ----------------------------<  124<H>  3.3<L>   |  25  |  1.28    Ca    9.0      04 Sep 2020 07:28  Phos  1.5     09-04  Mg     2.0     09-04      Culture Results:   No growth (09-02 @ 15:04)  Culture Results:   No growth to date. (09-02 @ 11:37)  Culture Results:   No growth to date. (09-02 @ 11:37)    < from: MR Chest No Cont (09.04.20 @ 10:22) >  Large lipomatous lesion within the subcutaneous fat of the left chest wall which is closely opposed to the superficial myofascia of the left pectoralis major muscle. This lesion has a thickened septation and areas of patchy increased T2 signal. The complexity of this lesion raises concern for liposarcoma.    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 She plans to take him to "[place] on Sunday, the latest Monday." She explained that she will have him follow up with a surgeon in [place] if he does not have the surgery during present hospitalization. All questions answered and RBA of planned surgery discussed.

## 2020-09-04 NOTE — CHART NOTE - NSCHARTNOTEFT_GEN_A_CORE
Upon Nutritional Assessment by the Registered Dietitian your patient was determined to meet criteria / has evidence of the following diagnosis/diagnoses:          [ ]  Mild Protein Calorie Malnutrition        [ ]  Moderate Protein Calorie Malnutrition        [x] Severe, Chronic Protein Calorie Malnutrition        [ ] Unspecified Protein Calorie Malnutrition        [x] Underweight / BMI <19        [ ] Morbid Obesity / BMI > 40      Findings as based on:  •  Comprehensive nutrition assessment and consultation  •  Calorie counts (nutrient intake analysis)  •  Food acceptance and intake status from observations by staff  •  Follow up  •  Patient education  •  Intervention secondary to interdisciplinary rounds  •   concerns      Treatment:    The following diet has been recommended:  Low Sodium diet + Ensure Clear 3x daily (720 kcal & 24 gm protein)    PROVIDER Section:     By signing this assessment you are acknowledging and agree with the diagnosis/diagnoses assigned by the Registered Dietitian    Comments:

## 2020-09-04 NOTE — PHYSICAL THERAPY INITIAL EVALUATION ADULT - PERTINENT HX OF CURRENT PROBLEM, REHAB EVAL
Pt is a 76 yo male with found by family with decreases responsiveness, abdominal distension. Pt for admitted for FTT

## 2020-09-04 NOTE — DISCHARGE NOTE PROVIDER - CARE PROVIDER_API CALL
follow up with doctors in [facility],   Phone: (   )    -  Fax: (   )    -  Follow Up Time:

## 2020-09-04 NOTE — DISCHARGE NOTE PROVIDER - HOSPITAL COURSE
75 years old male presents with FTT, distened abd, noted to have urinary retention and acute kidney injury s/p toure placement with improvement.    Also noted to have lump of chest, MR noted possible liposarcoma.    Sx consulted-----------    Renal also consulted for renal failure, likely post obstructive, improved after toure.    PT recs---------- 75 years old male with acute kidney injury and after toure placed 1700  liter of urine came out - patient used to be on meds  like warfarin but none since january              Problem/Plan - 1:    ·  Problem: Acute renal failure, unspecified acute renal failure type.  Plan: presumed post obstructive uropathy toure to remain for now     creatinine has improved greatly      started flomax              Problem/Plan - 2:    ·  Problem: Failure to thrive in adult.  Plan: Probably secondary to acute kidney injury.          Problem/Plan - 3:    ·  Problem: Hydronephrosis, unspecified hydronephrosis type.  Plan: urology consult - Kirill pending presumed due to prostate enlargement     Toure     started flomax     toure to remain.     change  to leg bag          Problem/Plan - 4:    ·  Problem: Chest wall mass.  Plan: suspicious for liposarcoma will consult general surgery     f/u mri.      mri showed lipoma vs liposarcoma was seen by surgery daughter offered to perform excision declines as she wishes to relocate patient to [place] on Sunday          Problem/Plan - 5:    ·  Problem: Hypokalemia.  Plan: will be replaced.          Problem/Plan - 6:    Problem: Hypophosphatemia. Plan: will be replaced.         Problem/Plan - 7:    ·  Problem: Hypernatremia.  Plan: improving.         cognitive impariment chronci per daughter vitamin b12 abd folate at very low end of normal staretd on suplemnts per daughter also reports h/o etoh heavey use .             Attending Attestation:     d/w daughter in detail patient is to to return to [place] on Sunday  and she will have her father follow up with doctors there     to live with daughter will provide daughter with labs and images .

## 2020-09-04 NOTE — DIETITIAN INITIAL EVALUATION ADULT. - PERTINENT LABORATORY DATA
09-04 Na147 mmol/L<H> Glu 124 mg/dL<H> K+ 3.3 mmol/L<L> Cr  1.28 mg/dL BUN 41 mg/dL<H> WBC 8.96 K/uL 09-04 Phos 1.5 mg/dL<L> 09-02 Alb 3.3 g/dL

## 2020-09-04 NOTE — DISCHARGE NOTE PROVIDER - PROVIDER TOKENS
FREE:[LAST:[follow up with doctors in [facility]],PHONE:[(   )    -],FAX:[(   )    -]]

## 2020-09-04 NOTE — DIETITIAN INITIAL EVALUATION ADULT. - PHYSICAL APPEARANCE
BMI = 17.8 (Using current wt 135# & pt's stated ht of 6'1"); No edema noted; Nutrition focused physical exam conducted; Subcutaneous fat loss: [severe] Orbital fat pads region, [severe]Buccal fat region, [severe]Triceps region,  [unable]Ribs region.  Muscle wasting: [severe]Temples region, [severe]Clavicle region, [severe]Shoulder region, [unable]Scapula region, [severe]Interosseous region, [unable]thigh region, [unable]Calf region

## 2020-09-04 NOTE — DIETITIAN INITIAL EVALUATION ADULT. - OTHER INFO
Pt with chest wall mass (suspicious for liposarcoma), acute renal failure (renal indices improving), failure to thrive, hydronephrosis, hypernatremia. Pt follows regular/unrestricted diet at home; reported decreased appetite/po intake x several months. Pt has noticed wt loss, however unable to quantify as pt unsure of current wt & UBW; pt with visible signs of malnutrition (see below). Pt denied difficulty chewing/swallowing; denied n/v/d/c; denied food allergies.

## 2020-09-04 NOTE — PROGRESS NOTE ADULT - PROBLEM SELECTOR PLAN 1
presumed post obstructive uropathy toure to remain for now   creatinine has improved greatly    started flomax  still await urology consult
presumed post obstructive uropathy  creatinine has improved will urology consulted

## 2020-09-04 NOTE — PROGRESS NOTE ADULT - PROBLEM SELECTOR PLAN 3
urology consult - Kirill pending presumed due to prostate enlargement   Toure   started flomax   toure to remain
urology consult - Kirill pending presumed due to prostate enlargement   Rodriguez

## 2020-09-05 VITALS
OXYGEN SATURATION: 95 % | TEMPERATURE: 98 F | SYSTOLIC BLOOD PRESSURE: 145 MMHG | RESPIRATION RATE: 17 BRPM | HEART RATE: 88 BPM | DIASTOLIC BLOOD PRESSURE: 90 MMHG

## 2020-09-05 LAB
ANION GAP SERPL CALC-SCNC: 5 MMOL/L — SIGNIFICANT CHANGE UP (ref 5–17)
ANION GAP SERPL CALC-SCNC: 6 MMOL/L — SIGNIFICANT CHANGE UP (ref 5–17)
BUN SERPL-MCNC: 13 MG/DL — SIGNIFICANT CHANGE UP (ref 7–23)
BUN SERPL-MCNC: 17 MG/DL — SIGNIFICANT CHANGE UP (ref 7–23)
CALCIUM SERPL-MCNC: 8.2 MG/DL — LOW (ref 8.5–10.1)
CALCIUM SERPL-MCNC: 8.5 MG/DL — SIGNIFICANT CHANGE UP (ref 8.5–10.1)
CHLORIDE SERPL-SCNC: 113 MMOL/L — HIGH (ref 96–108)
CHLORIDE SERPL-SCNC: 116 MMOL/L — HIGH (ref 96–108)
CO2 SERPL-SCNC: 25 MMOL/L — SIGNIFICANT CHANGE UP (ref 22–31)
CO2 SERPL-SCNC: 26 MMOL/L — SIGNIFICANT CHANGE UP (ref 22–31)
CREAT SERPL-MCNC: 0.93 MG/DL — SIGNIFICANT CHANGE UP (ref 0.5–1.3)
CREAT SERPL-MCNC: 0.95 MG/DL — SIGNIFICANT CHANGE UP (ref 0.5–1.3)
GLUCOSE SERPL-MCNC: 107 MG/DL — HIGH (ref 70–99)
GLUCOSE SERPL-MCNC: 119 MG/DL — HIGH (ref 70–99)
HCT VFR BLD CALC: 32.6 % — LOW (ref 39–50)
HGB BLD-MCNC: 10.2 G/DL — LOW (ref 13–17)
MAGNESIUM SERPL-MCNC: 1.5 MG/DL — LOW (ref 1.6–2.6)
MAGNESIUM SERPL-MCNC: 2.1 MG/DL — SIGNIFICANT CHANGE UP (ref 1.6–2.6)
MCHC RBC-ENTMCNC: 28.9 PG — SIGNIFICANT CHANGE UP (ref 27–34)
MCHC RBC-ENTMCNC: 31.3 GM/DL — LOW (ref 32–36)
MCV RBC AUTO: 92.4 FL — SIGNIFICANT CHANGE UP (ref 80–100)
NRBC # BLD: 0 /100 WBCS — SIGNIFICANT CHANGE UP (ref 0–0)
PHOSPHATE SERPL-MCNC: 1.4 MG/DL — LOW (ref 2.5–4.5)
PHOSPHATE SERPL-MCNC: 2.4 MG/DL — LOW (ref 2.5–4.5)
PLATELET # BLD AUTO: 134 K/UL — LOW (ref 150–400)
POTASSIUM SERPL-MCNC: 3.7 MMOL/L — SIGNIFICANT CHANGE UP (ref 3.5–5.3)
POTASSIUM SERPL-MCNC: 3.8 MMOL/L — SIGNIFICANT CHANGE UP (ref 3.5–5.3)
POTASSIUM SERPL-SCNC: 3.7 MMOL/L — SIGNIFICANT CHANGE UP (ref 3.5–5.3)
POTASSIUM SERPL-SCNC: 3.8 MMOL/L — SIGNIFICANT CHANGE UP (ref 3.5–5.3)
RBC # BLD: 3.53 M/UL — LOW (ref 4.2–5.8)
RBC # FLD: 13.2 % — SIGNIFICANT CHANGE UP (ref 10.3–14.5)
SODIUM SERPL-SCNC: 144 MMOL/L — SIGNIFICANT CHANGE UP (ref 135–145)
SODIUM SERPL-SCNC: 147 MMOL/L — HIGH (ref 135–145)
WBC # BLD: 7.26 K/UL — SIGNIFICANT CHANGE UP (ref 3.8–10.5)
WBC # FLD AUTO: 7.26 K/UL — SIGNIFICANT CHANGE UP (ref 3.8–10.5)

## 2020-09-05 PROCEDURE — 99238 HOSP IP/OBS DSCHRG MGMT 30/<: CPT

## 2020-09-05 RX ORDER — MAGNESIUM OXIDE 400 MG ORAL TABLET 241.3 MG
1 TABLET ORAL
Qty: 6 | Refills: 0
Start: 2020-09-05 | End: 2020-09-07

## 2020-09-05 RX ORDER — FOLIC ACID 0.8 MG
1 TABLET ORAL
Qty: 30 | Refills: 0
Start: 2020-09-05 | End: 2020-10-04

## 2020-09-05 RX ORDER — MAGNESIUM SULFATE 500 MG/ML
2 VIAL (ML) INJECTION ONCE
Refills: 0 | Status: COMPLETED | OUTPATIENT
Start: 2020-09-05 | End: 2020-09-05

## 2020-09-05 RX ORDER — TAMSULOSIN HYDROCHLORIDE 0.4 MG/1
1 CAPSULE ORAL
Qty: 30 | Refills: 0
Start: 2020-09-05 | End: 2020-10-04

## 2020-09-05 RX ORDER — POTASSIUM PHOSPHATE, MONOBASIC POTASSIUM PHOSPHATE, DIBASIC 236; 224 MG/ML; MG/ML
15 INJECTION, SOLUTION INTRAVENOUS ONCE
Refills: 0 | Status: COMPLETED | OUTPATIENT
Start: 2020-09-05 | End: 2020-09-05

## 2020-09-05 RX ORDER — PREGABALIN 225 MG/1
1 CAPSULE ORAL
Qty: 30 | Refills: 0
Start: 2020-09-05 | End: 2020-10-04

## 2020-09-05 RX ORDER — SODIUM,POTASSIUM PHOSPHATES 278-250MG
1 POWDER IN PACKET (EA) ORAL
Refills: 0 | Status: DISCONTINUED | OUTPATIENT
Start: 2020-09-05 | End: 2020-09-05

## 2020-09-05 RX ORDER — SODIUM,POTASSIUM PHOSPHATES 278-250MG
1 POWDER IN PACKET (EA) ORAL
Qty: 4 | Refills: 0
Start: 2020-09-05 | End: 2020-09-06

## 2020-09-05 RX ADMIN — HEPARIN SODIUM 5000 UNIT(S): 5000 INJECTION INTRAVENOUS; SUBCUTANEOUS at 06:40

## 2020-09-05 RX ADMIN — Medication 50 GRAM(S): at 11:20

## 2020-09-05 RX ADMIN — Medication 1 PACKET(S): at 11:20

## 2020-09-05 RX ADMIN — Medication 1 MILLIGRAM(S): at 11:23

## 2020-09-05 RX ADMIN — PREGABALIN 1000 MICROGRAM(S): 225 CAPSULE ORAL at 11:21

## 2020-09-05 RX ADMIN — SODIUM CHLORIDE 60 MILLILITER(S): 9 INJECTION, SOLUTION INTRAVENOUS at 06:39

## 2020-09-05 RX ADMIN — POTASSIUM PHOSPHATE, MONOBASIC POTASSIUM PHOSPHATE, DIBASIC 62.5 MILLIMOLE(S): 236; 224 INJECTION, SOLUTION INTRAVENOUS at 11:20

## 2020-09-05 NOTE — DISCHARGE NOTE NURSING/CASE MANAGEMENT/SOCIAL WORK - PATIENT PORTAL LINK FT
You can access the FollowMyHealth Patient Portal offered by [facility] by registering at the following website: [email or web address]. By joining [facility]’s FollowMyHealth portal, you will also be able to view your health information using other applications (apps) compatible with our system.

## 2020-09-07 LAB
CULTURE RESULTS: SIGNIFICANT CHANGE UP
CULTURE RESULTS: SIGNIFICANT CHANGE UP
SPECIMEN SOURCE: SIGNIFICANT CHANGE UP
SPECIMEN SOURCE: SIGNIFICANT CHANGE UP

## 2022-02-02 NOTE — PROGRESS NOTE ADULT - ASSESSMENT
75 years old male with acute kidney injury and after toure placed 1700  liter of urine came out - patient used to be on meds  like warfarin but none since january
75 years old male with acute kidney injury and after toure placed 1700  liter of urine came out - patient used to be on meds  like warfarin but none since january
yes

## 2022-02-13 NOTE — PROGRESS NOTE ADULT - SUBJECTIVE AND OBJECTIVE BOX
Patient is a 75y old  Male who presents with a chief complaint of failure to thrive (03 Sep 2020 09:24)      OVERNIGHT EVENTS:  none     MEDICATIONS  (STANDING):  dextrose 5% + sodium chloride 0.45%. 1000 milliLiter(s) (60 mL/Hr) IV Continuous <Continuous>  heparin   Injectable 5000 Unit(s) SubCutaneous every 12 hours  influenza   Vaccine 0.5 milliLiter(s) IntraMuscular once  potassium chloride    Tablet ER 40 milliEquivalent(s) Oral every 4 hours  potassium phosphate IVPB 15 milliMole(s) IV Intermittent once  tamsulosin 0.4 milliGRAM(s) Oral at bedtime    MEDICATIONS  (PRN):    Allergies    No Known Allergies    Intolerances        SUBJECTIVE: in bed in NAD, no acute events overnight     Vital Signs Last 24 Hrs  T(C): 37.1 (04 Sep 2020 05:32), Max: 37.1 (04 Sep 2020 05:32)  T(F): 98.7 (04 Sep 2020 05:32), Max: 98.7 (04 Sep 2020 05:32)  HR: 101 (04 Sep 2020 05:32) (101 - 106)  BP: 128/80 (04 Sep 2020 05:32) (109/64 - 128/80)  BP(mean): --  RR: 18 (04 Sep 2020 05:32) (18 - 20)  SpO2: 98% (04 Sep 2020 05:32) (97% - 98%)    PHYSICAL EXAM:  GENERAL: NAD, well-groomed, well-developed  HEAD:  Atraumatic, Normocephalic  EYES: EOMI, PERRLA, conjunctiva and sclera clear  ENMT: No tonsillar erythema, exudates, or enlargement; Moist mucous membranes, Good dentition, No lesions  NECK: Supple, No JVD, Normal thyroid  CHEST/LUNG: Clear to  auscultation bilaterally; No rales, rhonchi, wheezing, or rubs  bilaterally, chest wall mass   HEART: Regular rate and rhythm; No murmurs, rubs, or gallops  ABDOMEN: Soft, Nontender, Nondistended; Bowel sounds present  EXTREMITIES:  2+ Peripheral Pulses, No clubbing, cyanosis, or edema BL LE  SKIN: No rashes or lesions  NERVOUS SYSTEM:  Alert & Oriented X1, Good concentration; Motor Strength 4-/5 B/L upper and lower extremities;   DTRs 2+ intact and symmetric, sensation intact BL    LABS:                                            11.7   8.96  )-----------( 126      ( 04 Sep 2020 07:28 )             35.6   09-04    147<H>  |  116<H>  |  41<H>  ----------------------------<  124<H>  3.3<L>   |  25  |  1.28    Ca    9.0      04 Sep 2020 07:28  Phos  1.5     09-04  Mg     2.0     09-04        Cultures;   CAPILLARY BLOOD GLUCOSE        Lipid panel:     CARDIAC MARKERS ( 02 Sep 2020 07:38 )  <.015 ng/mL / x     / x     / x     / 2.1 ng/mL        RADIOLOGY & ADDITIONAL TESTS:  < from: CT Abdomen and Pelvis No Cont (09.02.20 @ 09:39) >    IMPRESSION:  1. No lung consolidations.  2. Anterior left upper chest wall mass (12.1 x 5.4 x 13.8 cm) containing fat and soft tissue, suspicious for liposarcoma.  3. Enlarged prostate gland.  4. Distended urinary bladder.  5. Mild bilateral hydroureteronephrosis, possibly secondary to bladder distention.  6. No bowel obstruction.        Imaging Personally Reviewed:  [ x] YES      Consultant(s) Notes Reviewed:  [x ] YES     Care Discussed with [x ] Consultants [X ] Patient [x ] Family  [x ]    [x ]  Other; RN Patient